# Patient Record
Sex: FEMALE | Race: AMERICAN INDIAN OR ALASKA NATIVE
[De-identification: names, ages, dates, MRNs, and addresses within clinical notes are randomized per-mention and may not be internally consistent; named-entity substitution may affect disease eponyms.]

---

## 2021-12-07 ENCOUNTER — HOSPITAL ENCOUNTER (EMERGENCY)
Dept: HOSPITAL 5 - ED | Age: 41
Discharge: HOME | End: 2021-12-07
Payer: COMMERCIAL

## 2021-12-07 VITALS — DIASTOLIC BLOOD PRESSURE: 92 MMHG | SYSTOLIC BLOOD PRESSURE: 151 MMHG
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
## 2021-12-07 DIAGNOSIS — Z79.899: ICD-10-CM

## 2021-12-07 DIAGNOSIS — R07.2: Primary | ICD-10-CM

## 2021-12-07 PROCEDURE — 71046 X-RAY EXAM CHEST 2 VIEWS: CPT

## 2021-12-07 PROCEDURE — 93005 ELECTROCARDIOGRAM TRACING: CPT

## 2021-12-07 PROCEDURE — 99283 EMERGENCY DEPT VISIT LOW MDM: CPT
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
## 2021-12-09 NOTE — ELECTROCARDIOGRAPH REPORT
[facility]

                                       

Test Date:    2021               Test Time:    12:52:14

Pat Name:     LIZBETH JAY           Department:   

Patient ID:   [id number]          Room:          

Gender:       F                        Technician:   MARITO

:          1980               Requested By: ELSY GUERRA

Order Number: [id number]              Reading MD:   Vera Rodriguez

                                 Measurements

Intervals                              Axis          

Rate:         80                       P:            31

WI:           150                      QRS:          2

QRSD:         79                       T:            52

QT:           368                                    

QTc:          425                                    

                           Interpretive Statements

Sinus rhythm

Probable anteroseptal infarct, old

No previous ECG available for comparison

Electronically Signed On 2021 10:18:28 EST by Vera Rodriguez